# Patient Record
Sex: FEMALE | Race: WHITE | Employment: OTHER | URBAN - METROPOLITAN AREA
[De-identification: names, ages, dates, MRNs, and addresses within clinical notes are randomized per-mention and may not be internally consistent; named-entity substitution may affect disease eponyms.]

---

## 2018-12-27 ENCOUNTER — CARE COORDINATION (OUTPATIENT)
Dept: CARDIOLOGY | Facility: CLINIC | Age: 63
End: 2018-12-27

## 2018-12-27 NOTE — PROGRESS NOTES
Referral- Heart Failure    DEMOGRAPHICS       Demographic Information on Sandy Sadler:    Sandy Sadler  Gender: female  : 1955  [address]  [facility] [address]  [phone] (home)     Medical Record: [id number]  Social Security Number: (Not on file)  Pharmacy: Data Unavailable  Primary Care Provider: No primary care provider on file.    Parent's names are: Data Unavailable (mother) and Data Unavailable (father).    Insurance: No coverage found.        REFERRAL INFORMATION         Referring Provider JEFF Cotto   Referring Clinic [facility]   Referring Contact Info  - [phone]  Faxed - [phone]   Referring Diagnosis Chronic Systolic Heart Failure - Non Ischemic CM - CAD - HTN - HLD   Referring [facility] routine     SPOC Notes:     Referred to us by Deepa and will need appt soon.       PROBLEM/MEDICATION LIST:       There is no problem list on file for this patient.          REFERRAL CHECKLIST:       Records Received  Records/Images Scanned Appointment Made   yes yes yes   Patient [employer] Insurance Entered Packet Sent   yes yes yes       PLAN:       Appts made with [place] on 19

## 2019-01-02 DIAGNOSIS — I50.22 CHRONIC SYSTOLIC CHF (CONGESTIVE HEART FAILURE) (H): ICD-10-CM

## 2019-01-02 DIAGNOSIS — I25.5 ISCHEMIC CARDIOMYOPATHY: Primary | ICD-10-CM

## 2019-01-02 PROBLEM — S72.009A HIP FRACTURE (H): Status: ACTIVE | Noted: 2018-05-26

## 2019-01-02 PROBLEM — M16.9 DEGENERATIVE JOINT DISEASE (DJD) OF HIP: Status: ACTIVE | Noted: 2018-03-06

## 2019-01-02 PROBLEM — Z96.642 HISTORY OF TOTAL LEFT HIP REPLACEMENT: Status: ACTIVE | Noted: 2018-06-05

## 2019-01-02 PROBLEM — Z78.9 ALCOHOL CONSUMPTION OF ONE TO FOUR DRINKS PER DAY: Status: ACTIVE | Noted: 2018-06-06

## 2019-01-02 PROBLEM — I25.10 CORONARY ARTERY DISEASE INVOLVING NATIVE CORONARY ARTERY OF NATIVE HEART WITHOUT ANGINA PECTORIS: Status: ACTIVE | Noted: 2018-09-25

## 2019-01-02 RX ORDER — TIOTROPIUM BROMIDE 18 UG/1
18 CAPSULE ORAL; RESPIRATORY (INHALATION) DAILY
COMMUNITY

## 2019-01-02 RX ORDER — METOPROLOL SUCCINATE 50 MG/1
1 TABLET, EXTENDED RELEASE ORAL 2 TIMES DAILY
COMMUNITY
Start: 2018-10-31

## 2019-01-02 RX ORDER — ROSUVASTATIN CALCIUM 40 MG/1
1 TABLET, COATED ORAL DAILY
COMMUNITY
Start: 2018-12-04 | End: 2019-12-09

## 2019-01-02 RX ORDER — TRIAMCINOLONE ACETONIDE 0.25 MG/G
CREAM TOPICAL
COMMUNITY
Start: 2015-12-07

## 2019-01-02 RX ORDER — TRAMADOL HYDROCHLORIDE 50 MG/1
1 TABLET ORAL EVERY 6 HOURS PRN
COMMUNITY
Start: 2018-12-17

## 2019-01-02 RX ORDER — FUROSEMIDE 40 MG
0.5 TABLET ORAL DAILY PRN
COMMUNITY
Start: 2018-11-20 | End: 2019-02-20

## 2019-01-02 RX ORDER — ASPIRIN 81 MG/1
1 TABLET ORAL DAILY
COMMUNITY

## 2019-01-02 RX ORDER — LIDOCAINE 40 MG/G
CREAM TOPICAL
Status: CANCELLED | OUTPATIENT
Start: 2019-01-02

## 2019-01-02 RX ORDER — ACETAMINOPHEN 500 MG
1 TABLET ORAL EVERY 6 HOURS PRN
COMMUNITY
Start: 2018-06-14

## 2019-01-02 RX ORDER — NITROGLYCERIN 0.4 MG/1
0.4 TABLET SUBLINGUAL EVERY 5 MIN PRN
COMMUNITY
Start: 2018-06-14 | End: 2019-06-19

## 2019-01-02 NOTE — PROGRESS NOTES
Called and left message for patient to call me directly to discuss referral for advanced HF. Records are available in Care Everywhere.  Patient [place] about [place] hours from the  and will likely need to have echo (last one in 9/2018), CPX, labs, HF provider appt and possibly RHC while she is in town.    Spoke with patient this afternoon. We discussed the need for the testing as noted above.  She is agreeable and we will attempt to coordinate and schedule the appts within the next few weeks.  Information given regarding staying at the [facility] while she is in town.

## 2019-01-16 ENCOUNTER — DOCUMENTATION ONLY (OUTPATIENT)
Dept: CARE COORDINATION | Facility: CLINIC | Age: 64
End: 2019-01-16

## 2019-01-28 DIAGNOSIS — I50.22 CHRONIC SYSTOLIC CHF (CONGESTIVE HEART FAILURE) (H): Primary | ICD-10-CM

## 2019-02-14 ENCOUNTER — CARE COORDINATION (OUTPATIENT)
Dept: CARDIOLOGY | Facility: CLINIC | Age: 64
End: 2019-02-14

## 2019-02-14 DIAGNOSIS — I77.1 STENOSIS OF LEFT SUBCLAVIAN ARTERY (H): ICD-10-CM

## 2019-02-14 PROBLEM — Z74.09 IMPAIRED MOBILITY AND ACTIVITIES OF DAILY LIVING: Status: ACTIVE | Noted: 2018-06-05

## 2019-02-14 PROBLEM — E87.1 HYPONATREMIA: Status: ACTIVE | Noted: 2018-06-05

## 2019-02-14 PROBLEM — Z78.9 IMPAIRED MOBILITY AND ACTIVITIES OF DAILY LIVING: Status: ACTIVE | Noted: 2018-06-05

## 2019-02-14 PROBLEM — Z96.9 RETAINED ORTHOPEDIC HARDWARE: Status: ACTIVE | Noted: 2018-03-06

## 2019-02-14 NOTE — PROGRESS NOTES
Patient is referred for advanced HF options from Deepa Mak APRN-CNP and Dr. Omar Hernandez from   [facility]    [address] [place] [address]    [phone]    Records available in Care Everywhere.  Patient is a 63yr old female  with a past medical history of chronic systolic heart failure, nonischemic cardiomyopathy, CAD, HTN, HLD, and tobacco abuse, osteoporosis.  She was admitted in 05/2018 with right humerus and femur fracture after tripping. Echocardiogram performed showed decrement in EF to 30-35%. Troponin was elevated as well and she underwent coronary angiogram, which revealed severe single-vessel and branch coronary artery disease with recommendations for PCI of RCA  in the future if symptoms persisted. Heart failure medications were started at low dosages and were unable to titrate due to hypotension. She was last seen in the HF clinic in in 09/2018, at which time she was feeling ok. Repeat echo showed EF down slightly to 25-30%. Given this, her HF medications were increased and it was recommended she undergo PCI of RCA  per Dr. JOSE M Hernandez. Also was noted significant difference in BP between right and left arm, therefore upper extremity duplex was ordered. Duplex showed >50% stenosis of L prox subclavian artery. Initially presented for PCI 9/24, but was noted to have hyponatremia and it was rescheduled. Had ICD implanted 10/4/18. Reported increased swelling on 10/10 and lasix was started. Underwent repeat angiogram on 10/15/18 for PCI to RCA , which was unsuccessful via antegrade approach. Viability study showed lack of viability in inerior area. Started on entresto and titrating up.      Fatigue and weak but states no symptoms have worsened. Is losing weight. Denies chest pain, syncope, or palpitations. No dizziness. No falls. Denies shortness of breath at rest, paroxysmal noctural dyspnea, or orthopnea. No worsening of dyspnea on exertion. Poor  appetite. No abdominal bloating. Has a bad left knee and needs knees surgery. This limits her activity.     Past Medical History:   Diagnosis Date     CHF (congestive heart failure)     Coronary artery disease     Distal radius fracture, right 5-10-14   right distal radius and ulna FX     Eczema 2/18/2008     Fractured hip 2011   fell at [facility]     Hypercholesteremia     Hypertension 10/8/2007     Lung nodule     Sinusitis     Tobacco abuse - .25 ppd for 33 years - notes state quit date of 5/26/2018.   Alcohol use - last note (12/2018) states drinks 10 cans beer/wk or 3 beers/day      ASSESSMENT/PLAN:  1. Chronic systolic heart failure. Nonischemic cardiomyopathy. NYHA FC III.   Echo 5/31/2018. LVEF 30-35%, 2+ MR, LVEDD 5.8 cm. Echo today shows LVEF of 25-30%, 2+ MR and LVEDd 5.9 cm.   ELIA 10/15/18. LVEF 20-25%, +2 MR   Beta-blocker: Continue Toprol XL 50 mg BID. Up-titrate as tolerated   ACEi/ARB/ARNI: Entresto. Increase to 24/26mg po in the morning and 49/51mg po in the evening. If tolerating, will go medium dose BID then. BMP in 1 week.   Aldosterone antagonist: Stopped due to hyponatremia.  Volume status: Appears compensated and euvolemic on exam. Recent BMP stable.   Diuretic regimen: Continue lasix 20 mg PRN  Rhythm: SR  Device therapy: s/p single chamber ICD  Ischemic assessment: Known  of RCA, unsuccessful PCI, branch disease    Advanced therapies: Upon reviewing clinical picture and case, she has hyponatremia and cachexia and unable to uptitrate Heart Failure meds. EF not improving. Her Heart Failure was first diagnosed 5/2018 after a fall but I think she likely had it for years undiagnosed. She is only 63. Lengthy discussion and she agreed upon being evaluated at the [facility]. Will send referral.      2. CAD: Angiogram 6/01/2018 revealed 30% left main, 30% distal LAD, 80% ostial septal, 50% ostial circumflex, 100%  of RCA. Attempted antegrade PCI of RCA , which was unsuccessful. Continue aspirin,  statin and BB.  There is lack of myocardial viability in apical septal and apical inferior segments, as well as in mid septal segment. The rest of myocardium is viable.    3. Hypertension. BP controlled. Meds as per CHF  4. HLD. Continue simvastatin. Last LDL 73.   5. Left subclavian artery stenosis noted per duplex.

## 2019-02-15 ENCOUNTER — HEALTH MAINTENANCE LETTER (OUTPATIENT)
Age: 64
End: 2019-02-15

## 2019-02-20 ENCOUNTER — HOSPITAL ENCOUNTER (OUTPATIENT)
Dept: CARDIOLOGY | Facility: CLINIC | Age: 64
Discharge: HOME | End: 2019-02-20
Attending: INTERNAL MEDICINE | Admitting: INTERNAL MEDICINE
Payer: COMMERCIAL

## 2019-02-20 ENCOUNTER — OFFICE VISIT (OUTPATIENT)
Dept: CARDIOLOGY | Facility: CLINIC | Age: 64
End: 2019-02-20
Attending: INTERNAL MEDICINE
Payer: COMMERCIAL

## 2019-02-20 VITALS
HEIGHT: 66 IN | WEIGHT: 98.4 LBS | SYSTOLIC BLOOD PRESSURE: 126 MMHG | BODY MASS INDEX: 15.81 KG/M2 | HEART RATE: 86 BPM | DIASTOLIC BLOOD PRESSURE: 79 MMHG | OXYGEN SATURATION: 97 %

## 2019-02-20 VITALS
SYSTOLIC BLOOD PRESSURE: 124 MMHG | DIASTOLIC BLOOD PRESSURE: 70 MMHG | WEIGHT: 100 LBS | BODY MASS INDEX: 16.07 KG/M2 | HEIGHT: 66 IN

## 2019-02-20 DIAGNOSIS — I50.22 CHRONIC SYSTOLIC CHF (CONGESTIVE HEART FAILURE) (H): ICD-10-CM

## 2019-02-20 DIAGNOSIS — I50.22 CHRONIC SYSTOLIC CHF (CONGESTIVE HEART FAILURE) (H): Primary | ICD-10-CM

## 2019-02-20 DIAGNOSIS — I25.5 ISCHEMIC CARDIOMYOPATHY: ICD-10-CM

## 2019-02-20 LAB
ALBUMIN SERPL-MCNC: 3.8 G/DL (ref 3.4–5)
ALP SERPL-CCNC: 113 U/L (ref 40–150)
ALT SERPL W P-5'-P-CCNC: 57 U/L (ref 0–50)
ANION GAP SERPL CALCULATED.3IONS-SCNC: 7 MMOL/L (ref 3–14)
AST SERPL W P-5'-P-CCNC: 37 U/L (ref 0–45)
BASOPHILS # BLD AUTO: 0 10E9/L (ref 0–0.2)
BASOPHILS NFR BLD AUTO: 0.2 %
BILIRUB SERPL-MCNC: 1 MG/DL (ref 0.2–1.3)
BUN SERPL-MCNC: 8 MG/DL (ref 7–30)
CALCIUM SERPL-MCNC: 9 MG/DL (ref 8.5–10.1)
CHLORIDE SERPL-SCNC: 95 MMOL/L (ref 94–109)
CO2 SERPL-SCNC: 28 MMOL/L (ref 20–32)
CREAT SERPL-MCNC: 0.38 MG/DL (ref 0.52–1.04)
DIFFERENTIAL METHOD BLD: ABNORMAL
EOSINOPHIL # BLD AUTO: 0 10E9/L (ref 0–0.7)
EOSINOPHIL NFR BLD AUTO: 0.2 %
ERYTHROCYTE [DISTWIDTH] IN BLOOD BY AUTOMATED COUNT: 12.6 % (ref 10–15)
GFR SERPL CREATININE-BSD FRML MDRD: >90 ML/MIN/{1.73_M2}
GLUCOSE SERPL-MCNC: 142 MG/DL (ref 70–99)
HCT VFR BLD AUTO: 47.6 % (ref 35–47)
HGB BLD-MCNC: 16.8 G/DL (ref 11.7–15.7)
IMM GRANULOCYTES # BLD: 0 10E9/L (ref 0–0.4)
IMM GRANULOCYTES NFR BLD: 0.3 %
LYMPHOCYTES # BLD AUTO: 1.9 10E9/L (ref 0.8–5.3)
LYMPHOCYTES NFR BLD AUTO: 17.3 %
MCH RBC QN AUTO: 38 PG (ref 26.5–33)
MCHC RBC AUTO-ENTMCNC: 35.3 G/DL (ref 31.5–36.5)
MCV RBC AUTO: 108 FL (ref 78–100)
MONOCYTES # BLD AUTO: 0.8 10E9/L (ref 0–1.3)
MONOCYTES NFR BLD AUTO: 7.5 %
NEUTROPHILS # BLD AUTO: 8.2 10E9/L (ref 1.6–8.3)
NEUTROPHILS NFR BLD AUTO: 74.5 %
NRBC # BLD AUTO: 0 10*3/UL
NRBC BLD AUTO-RTO: 0 /100
PLATELET # BLD AUTO: 274 10E9/L (ref 150–450)
POTASSIUM SERPL-SCNC: 4.1 MMOL/L (ref 3.4–5.3)
PROT SERPL-MCNC: 7.7 G/DL (ref 6.8–8.8)
RBC # BLD AUTO: 4.42 10E12/L (ref 3.8–5.2)
SODIUM SERPL-SCNC: 130 MMOL/L (ref 133–144)
WBC # BLD AUTO: 11 10E9/L (ref 4–11)

## 2019-02-20 PROCEDURE — G0463 HOSPITAL OUTPT CLINIC VISIT: HCPCS | Mod: 25

## 2019-02-20 PROCEDURE — 36415 COLL VENOUS BLD VENIPUNCTURE: CPT | Performed by: INTERNAL MEDICINE

## 2019-02-20 PROCEDURE — 94621 CARDIOPULM EXERCISE TESTING: CPT | Mod: 26 | Performed by: INTERNAL MEDICINE

## 2019-02-20 PROCEDURE — 93295 DEV INTERROG REMOTE 1/2/MLT: CPT | Performed by: INTERNAL MEDICINE

## 2019-02-20 PROCEDURE — 80053 COMPREHEN METABOLIC PANEL: CPT | Performed by: INTERNAL MEDICINE

## 2019-02-20 PROCEDURE — 85025 COMPLETE CBC W/AUTO DIFF WBC: CPT | Performed by: INTERNAL MEDICINE

## 2019-02-20 PROCEDURE — 93296 REM INTERROG EVL PM/IDS: CPT | Mod: ZF

## 2019-02-20 PROCEDURE — 99204 OFFICE O/P NEW MOD 45 MIN: CPT | Mod: 25 | Performed by: INTERNAL MEDICINE

## 2019-02-20 PROCEDURE — 94621 CARDIOPULM EXERCISE TESTING: CPT

## 2019-02-20 ASSESSMENT — MIFFLIN-ST. JEOR
SCORE: 1023.09
SCORE: 1030.1

## 2019-02-20 ASSESSMENT — PAIN SCALES - GENERAL: PAINLEVEL: NO PAIN (0)

## 2019-02-20 NOTE — PATIENT INSTRUCTIONS
Cardiology Providers you saw during your visit:  Dr. Callaway    Medication changes:  No medication changes    Follow up:  1- Right heart cath tomorrow as planned.  2- Please see endocrinology for Osteoporosis treatment.  3-We will send you information about VAD    Please return to clinic for follow-up:  With Dr. Callaway in 1 year      Please call if you have:  1. Weight gain of more than 2 pounds in a day or 5 pounds in a week  2. Increased shortness of breath, swelling or bloating  3. Dizziness, lightheadedness   4. Any questions or concerns.     Follow the American Heart Association Diet and Lifestyle recommendations:  Limit saturated fat, trans fat, sodium, red meat, sweets and sugar-sweetened beverages. If you choose to eat red meat, compare labels and select the leanest cuts available.  Aim for at least 150 minutes of moderate physical activity or 75 minutes of vigorous physical activity - or an equal combination of both - each week.    During business hours: [phone], press option # 1 to be routed to the [facility] then option # 3 for medical questions to speak with a nurse     After hours, weekends or holidays: On Call Cardiologist- [phone]   option #4 and ask to speak to the on-call Cardiologist. Inform them you are a CORE/heart failure patient at the [facility].      Bk Nelson, RN  Cardiology Nurse Care Coordinator    Keep up the good work!    Take Care!

## 2019-02-20 NOTE — NURSING NOTE
Chief Complaint   Patient presents with     Follow Up     reason for visit:New patient- Chronic systolic congestive heart failure     Vitals were taken and medications were reconciled.    12:19 PM Melvin Rojas, Student CMA

## 2019-02-21 ENCOUNTER — HOSPITAL ENCOUNTER (OUTPATIENT)
Facility: CLINIC | Age: 64
Discharge: HOME | End: 2019-02-21
Attending: INTERNAL MEDICINE | Admitting: INTERNAL MEDICINE
Payer: COMMERCIAL

## 2019-02-21 ENCOUNTER — APPOINTMENT (OUTPATIENT)
Dept: MEDSURG UNIT | Facility: CLINIC | Age: 64
End: 2019-02-21
Payer: COMMERCIAL

## 2019-02-21 VITALS
DIASTOLIC BLOOD PRESSURE: 70 MMHG | TEMPERATURE: 97.7 F | BODY MASS INDEX: 16.23 KG/M2 | RESPIRATION RATE: 20 BRPM | SYSTOLIC BLOOD PRESSURE: 155 MMHG | OXYGEN SATURATION: 99 % | HEIGHT: 66 IN | WEIGHT: 101 LBS

## 2019-02-21 DIAGNOSIS — I25.5 ISCHEMIC CARDIOMYOPATHY: ICD-10-CM

## 2019-02-21 DIAGNOSIS — I50.22 CHRONIC SYSTOLIC CHF (CONGESTIVE HEART FAILURE) (H): ICD-10-CM

## 2019-02-21 LAB
ANION GAP SERPL CALCULATED.3IONS-SCNC: 12 MMOL/L (ref 3–14)
BUN SERPL-MCNC: 7 MG/DL (ref 7–30)
CALCIUM SERPL-MCNC: 9.1 MG/DL (ref 8.5–10.1)
CHLORIDE SERPL-SCNC: 94 MMOL/L (ref 94–109)
CO2 SERPL-SCNC: 28 MMOL/L (ref 20–32)
CREAT SERPL-MCNC: 0.42 MG/DL (ref 0.52–1.04)
GFR SERPL CREATININE-BSD FRML MDRD: >90 ML/MIN/{1.73_M2}
GLUCOSE SERPL-MCNC: 72 MG/DL (ref 70–99)
POTASSIUM SERPL-SCNC: 3.1 MMOL/L (ref 3.4–5.3)
SODIUM SERPL-SCNC: 134 MMOL/L (ref 133–144)

## 2019-02-21 PROCEDURE — 40000166 ZZH STATISTIC PP CARE STAGE 1

## 2019-02-21 PROCEDURE — 25000125 ZZHC RX 250: Performed by: INTERNAL MEDICINE

## 2019-02-21 PROCEDURE — 93451 RIGHT HEART CATH: CPT | Performed by: INTERNAL MEDICINE

## 2019-02-21 PROCEDURE — 93451 RIGHT HEART CATH: CPT | Mod: 26 | Performed by: INTERNAL MEDICINE

## 2019-02-21 PROCEDURE — 36415 COLL VENOUS BLD VENIPUNCTURE: CPT | Performed by: INTERNAL MEDICINE

## 2019-02-21 PROCEDURE — 27210794 ZZH OR GENERAL SUPPLY STERILE: Performed by: INTERNAL MEDICINE

## 2019-02-21 PROCEDURE — 80048 BASIC METABOLIC PNL TOTAL CA: CPT | Performed by: INTERNAL MEDICINE

## 2019-02-21 RX ORDER — LIDOCAINE HYDROCHLORIDE 10 MG/ML
INJECTION, SOLUTION EPIDURAL; INFILTRATION; INTRACAUDAL; PERINEURAL
Status: DISCONTINUED | OUTPATIENT
Start: 2019-02-21 | End: 2019-02-21 | Stop reason: HOSPADM

## 2019-02-21 RX ORDER — LIDOCAINE 40 MG/G
CREAM TOPICAL
Status: COMPLETED | OUTPATIENT
Start: 2019-02-21 | End: 2019-02-21

## 2019-02-21 RX ADMIN — LIDOCAINE: 40 CREAM TOPICAL at 10:36

## 2019-02-21 ASSESSMENT — MIFFLIN-ST. JEOR: SCORE: 1034.63

## 2019-02-21 NOTE — DISCHARGE INSTRUCTIONS
[facility]                        Interventional Cardiology  Discharge Instructions   Post Right Heart Cath      AFTER YOU GO HOME:    DO drink plenty of fluids    DO resume your regular diet and medications unless otherwise instructed by your Primary Physician    Do Not scrub the procedure site vigorously    No lotion or powder to the puncture site for 3 days    CALL YOUR PRIMARY PHYSICIAN IF: You may resume all normal activity.  Monitor neck site for bleeding, swelling, or voice changes. If you notice bleeding or swelling immediately apply pressure to the site and call number below to speak with Cardiology Fellow.  If you experience any changes in your breathing you should call your doctor immediately or come to the closest Emergency Department.  Do not drive yourself.    ADDITIONAL INSTRUCTIONS: Medications: You are to resume all home medications including anticoagulation therapy unless otherwise advised by your primary cardiologist or nurse coordinator.    Follow Up: Per your primary cardiology team    If you have any questions or concerns regarding your procedure site please call [phone] at anytime and ask for Cardiology Fellow on call.  They are available 24 hours a day.  You may also contact the Cardiology Clinic after hours number at [phone].                                                       Telephone Numbers [phone] Monday-Friday 8:00 am to 4:30 pm    [phone] After 4:30 pm Monday-Friday, Weekends & Holidays  Ask for Interventional Cardiologist on call. Someone is on call 24 hours/day   [facility] toll free number [phone] Monday-Friday 8:00 am to 4:30 pm   [facility] Emergency Dept [phone]

## 2019-02-21 NOTE — PROGRESS NOTES
Returned from [facility] s/p RHC.  VSS.  RIJ site covered with a band aid, CDI.  Discharge instructions previously reviewed.  Discharged to shuttle after eating breakfast

## 2019-02-21 NOTE — IP AVS SNAPSHOT
[address] [address]                                    After Visit Summary   2/21/2019    Sandy Sadler    MRN: [id number]           After Visit Summary Signature Page    I have received my discharge instructions, and my questions have been answered. I have discussed any challenges I see with this plan with the nurse or doctor.    ..........................................................................................................................................  Patient/Patient Representative Signature      ..........................................................................................................................................  Patient Representative Print Name and Relationship to Patient    ..................................................               ................................................  Date                                   Time    ..........................................................................................................................................  Reviewed by Signature/Title    ...................................................              ..............................................  Date                                               Time          22EPIC Rev 08/18

## 2019-02-21 NOTE — IP AVS SNAPSHOT
MRN:[id number]                      After Visit Summary   2/21/2019    Sandy Sadler    MRN: [id number]           Visit Information        Department      2/21/2019  9:36 AM [address] [facility]          Review of your medicines      UNREVIEWED medicines. Ask your doctor about these medicines       Dose / Directions   acetaminophen 500 MG tablet  Commonly known as:  TYLENOL      Dose:  1 tablet  Take 1 tablet by mouth every 6 hours as needed for pain  Refills:  0     aspirin 81 MG EC tablet      Dose:  1 tablet  Take 1 tablet by mouth daily  Refills:  0     diphenhydrAMINE-acetaminophen  MG tablet  Commonly known as:  TYLENOL PM      Dose:  1-2 tablet  Take 1-2 tablets by mouth nightly as needed for Insomnia  Refills:  0     metoprolol succinate ER 50 MG 24 hr tablet  Commonly known as:  TOPROL-XL      Dose:  1 tablet  Take 1 tablet by mouth 2 times daily  Refills:  0     nitroGLYcerin 0.4 MG sublingual tablet  Commonly known as:  NITROSTAT      Dose:  0.4 mg  Place 0.4 mg under the tongue every 5 minutes as needed for chest pain Up to total of 3  Refills:  0     rosuvastatin 40 MG tablet  Commonly known as:  CRESTOR      Dose:  1 tablet  Take 1 tablet by mouth daily  Refills:  0     sacubitril-valsartan  MG per tablet  Commonly known as:  ENTRESTO  Used for:  Chronic systolic CHF (congestive heart failure) (H)      Dose:  1 tablet  Take 1 tablet by mouth 2 times daily  Quantity:  180 tablet  Refills:  3     SPIRIVA HANDIHALER 18 MCG inhaled capsule  Generic drug:  tiotropium      Dose:  18 mcg  Inhale 18 mcg into the lungs daily  Refills:  0     traMADol 50 MG tablet  Commonly known as:  ULTRAM      Dose:  1 tablet  Take 1 tablet by mouth every 6 hours as needed for moderate pain  Refills:  0     triamcinolone 0.025 % cream  Commonly known as:  KENALOG      Apply to affected area 2 times a day as needed for other (Specify) (eczema). Please dispense larger quantity than 15 g -  thanks  Refills:  0     URE-NA 15 g Pack packet  Generic drug:  Urea      Take 1 packet (15 g) by mouth 1 time per day  Refills:  0     vitamin D3 1000 units (25 mcg) tablet  Commonly known as:  CHOLECALCIFEROL      Dose:  1 tablet  Take 1 tablet by mouth daily  Refills:  0              Protect others around you: Learn how to safely use, store and throw away your medicines at www.disposemymeds.org.       Follow-ups after your visit       Your next 10 appointments already scheduled    Feb 21, 2019  Procedure with Maico Elias MD  [facility], [place],  Heart Cath Lab ([facility], [facility]) [address] [facility] [address]  [phone]   The [facility] is located on the corner of [facility] and [facility] on the [facility]. It is easily accessible from virtually any point in the [place] area, via [address] and [address].      Care Instructions       Further instructions from your care team       [facility]                        Interventional Cardiology  Discharge Instructions   Post Right Heart Cath      AFTER YOU GO HOME:    DO drink plenty of fluids    DO resume your regular diet and medications unless otherwise instructed by your Primary Physician    Do Not scrub the procedure site vigorously    No lotion or powder to the puncture site for 3 days    CALL YOUR PRIMARY PHYSICIAN IF: You may resume all normal activity.  Monitor neck site for bleeding, swelling, or voice changes. If you notice bleeding or swelling immediately apply pressure to the site and call number below to speak with Cardiology Fellow.  If you experience any changes in your breathing you should call your doctor immediately or come to the closest Emergency Department.  Do not drive yourself.    ADDITIONAL INSTRUCTIONS: Medications: You are to resume all home medications including anticoagulation therapy unless otherwise advised by your primary  cardiologist or nurse coordinator.    Follow Up: Per your primary cardiology team    If you have any questions or concerns regarding your procedure site please call [phone] at anytime and ask for Cardiology Fellow on call.  They are available 24 hours a day.  You may also contact the Cardiology Clinic after hours number at [phone].                                                       Telephone Numbers [phone] Monday-Friday 8:00 am to 4:30 pm    [phone] After 4:30 pm Monday-Friday, Weekends & Holidays  Ask for Interventional Cardiologist on call. Someone is on call 24 hours/day   [facility] toll free number [phone] Monday-Friday 8:00 am to 4:30 pm   [facility] Emergency Dept [phone]                   Additional Information About Your Visit       MyChart Information    "[employer]"t gives you secure access to your electronic health record. If you see a primary care provider, you can also send messages to your care team and make appointments. If you have questions, please call your primary care clinic.  If you do not have a primary care provider, please call [phone] and they will assist you.       Care EveryWhere ID    This is your Care EveryWhere ID. This could be used by other organizations to access your [facility] medical records  [id number]       Your Vitals Were     Blood Pressure   134/64 (BP Location: Right arm, Cuff Size: Adult Small)    Temperature   97.7  F (36.5  C) (Oral)    Respirations   20    Pulse Oximetry   99%           Primary Care Provider Office Phone # Fax #    Yayo RUIZ Spencer [phone]      Equal Access to Services    [facility] CenterEVELIN : Hadii aad ku hadasho Soomaali, waaxda luqadaha, qaybta kaalmada adeegadan, tory forbes . So [facility] [phone].    ATENCIÓN: Si habla español, tiene a carroll disposición servicios gratuitos de asistencia lingüística. Llame al [phone].    We comply with applicable federal civil rights laws  and [place] laws. We do not discriminate on the basis of race, color, national origin, age, disability, sex, sexual orientation, or gender identity.           Thank you!    Thank you for choosing [facility] for your care. Our goal is always to provide you with excellent care. Hearing back from our patients is one way we can continue to improve our services. Please take a few minutes to complete the written survey that you may receive in the mail after you visit with us. Thank you!            Medication List      ASK your doctor about these medications          Morning Afternoon Evening Bedtime As Needed    acetaminophen 500 MG tablet  Also known as:  TYLENOL  INSTRUCTIONS:  Take 1 tablet by mouth every 6 hours as needed for pain                     aspirin 81 MG EC tablet  INSTRUCTIONS:  Take 1 tablet by mouth daily                     diphenhydrAMINE-acetaminophen  MG tablet  Also known as:  TYLENOL PM  INSTRUCTIONS:  Take 1-2 tablets by mouth nightly as needed for Insomnia                     metoprolol succinate ER 50 MG 24 hr tablet  Also known as:  TOPROL-XL  INSTRUCTIONS:  Take 1 tablet by mouth 2 times daily                     nitroGLYcerin 0.4 MG sublingual tablet  Also known as:  NITROSTAT  INSTRUCTIONS:  Place 0.4 mg under the tongue every 5 minutes as needed for chest pain Up to total of 3                     rosuvastatin 40 MG tablet  Also known as:  CRESTOR  INSTRUCTIONS:  Take 1 tablet by mouth daily                     sacubitril-valsartan  MG per tablet  Also known as:  ENTRESTO  INSTRUCTIONS:  Take 1 tablet by mouth 2 times daily                     SPIRIVA HANDIHALER 18 MCG inhaled capsule  INSTRUCTIONS:  Inhale 18 mcg into the lungs daily  Generic drug:  tiotropium                     traMADol 50 MG tablet  Also known as:  ULTRAM  INSTRUCTIONS:  Take 1 tablet by mouth every 6 hours as needed for moderate pain                     triamcinolone 0.025 % cream  Also known as:   KENALOG  INSTRUCTIONS:  Apply to affected area 2 times a day as needed for other (Specify) (eczema). Please dispense larger quantity than 15 g - thanks                     URE-NA 15 g Pack packet  INSTRUCTIONS:  Take 1 packet (15 g) by mouth 1 time per day  Generic drug:  Urea                     vitamin D3 1000 units (25 mcg) tablet  Also known as:  CHOLECALCIFEROL  INSTRUCTIONS:  Take 1 tablet by mouth daily

## 2019-02-21 NOTE — PROGRESS NOTES
1035  Prep is complete for procedure.  Sandy is here for right heart cath.  Denies any pain.  Consent is complete.  Labs are from yesterday.  CTRN

## 2019-03-24 NOTE — PROGRESS NOTES
HPI: 62 yo female with past medical history of chronic systolic heart failure, CAD but heart failure out of proportion to her CAD HTN, HLD, and tobacco abuse, osteoporosis is referred for evaluation for advanced therapies.  Pt was admitted in [place] in 05/2018 with right humerus and femur fracture after tripping. Echocardiogram performed showed decrement in EF to 30-35%. Troponin was elevated as well and she underwent coronary angiogram, which revealed severe single-vessel and branch coronary artery disease with recommendations for PCI of RCA  in the future if symptoms persisted. Heart failure medications were started at low dosages and were unable to titrate due to hypotension. She was last seen in the [place] HF clinic in in 09/2018, at which time she was feeling ok. Repeat echo showed EF down slightly to 25-30%. Given this, her HF medicsations were increased and it was recommended she undergo PCI of RCA  per Dr. JOSE M Hernandez. Also was noted significant difference in BP between right and left arm, therefore upper extremity duplex was ordered. Duplex showed >50% stenosis of L prox subclavian artery. Initially presented for PCI 9/24, but was noted to have hyponatremia and it was rescheduled. Had ICD implanted 10/4/18. Reported increased swelling on 10/10 and lasix was started. Underwent repeat angiogram on 10/15/18 for PCI to RCA , which was unsuccessful via antegrade approach. Viability study showed lack of viability in inerior area thus further intervention was not pursued.  She was then transitioed to [facility] with ongoing titration.     Today patient reports that overall she has been well fairly well.  Reports that at present she feels she is limited more by her knees and hips and not so much by sob.  She denies any chest pain or pressure, orthopnea, pnd, palpitations, syncope/presyncope or nely.  She also denies any problems with her medications and reports compliance.      PAST MEDICAL  HISTORY:  Past Medical History:   Diagnosis Date     Arthritis      Heart disease      Hypertension      Stenosis of left subclavian artery  2019    Per duplex scan   - CAD  - chronic systolic heart failure with heart failure out of proportion to her CAD    FAMILY HISTORY:    Other Mother   osteoporosis     Dementia Mother     Heart Father     Heart Brother   pacemaker    Hypertension Brother     No Known Problems Daughter     No Known Problems Son     Heart Maternal Aunt    in her 50's; MI     Heart Maternal Uncle   diagnosed in his 20's     Diabetes Paternal Grandmother     Other Paternal Grandfather   emphysema         SOCIAL HISTORY:  Social History     Socioeconomic History     Marital status:      Spouse name: Not on file     Number of children: Not on file     Years of education: Not on file     Highest education level: Not on file   Occupational History     Not on file   Social Needs     Financial resource strain: Not on file     Food insecurity:     Worry: Not on file     Inability: Not on file     Transportation needs:     Medical: Not on file     Non-medical: Not on file   Tobacco Use     Smoking status: Former Smoker     Packs/day: 5.00     Types: Cigarettes     Last attempt to quit: 2018     Years since quittin.8     Smokeless tobacco: Never Used   Substance and Sexual Activity     Alcohol use: Yes     Drug use: None     Sexual activity: Not on file   Lifestyle     Physical activity:     Days per week: Not on file     Minutes per session: Not on file     Stress: Not on file   Relationships     Social connections:     Talks on phone: Not on file     Gets together: Not on file     Attends [employer] service: Not on file     Active member of club or organization: Not on file     Attends meetings of clubs or organizations: Not on file     Relationship status: Not on file     Intimate partner violence:     Fear of current or ex partner: Not on file     Emotionally abused: Not on  file     Physically abused: Not on file     Forced sexual activity: Not on file   Other Topics Concern     Parent/sibling w/ CABG, MI or angioplasty before 65F 55M? Not Asked   Social History Narrative     Not on file       CURRENT MEDICATIONS:    Current Outpatient Medications on File Prior to Visit:  acetaminophen (TYLENOL) 500 MG tablet Take 1 tablet by mouth every 6 hours as needed for pain   aspirin 81 MG EC tablet Take 1 tablet by mouth daily   diphenhydrAMINE-acetaminophen (TYLENOL PM)  MG tablet Take 1-2 tablets by mouth nightly as needed for Insomnia   metoprolol succinate ER (TOPROL-XL) 50 MG 24 hr tablet Take 1 tablet by mouth 2 times daily   nitroGLYcerin (NITROSTAT) 0.4 MG sublingual tablet Place 0.4 mg under the tongue every 5 minutes as needed for chest pain Up to total of 3   rosuvastatin (CRESTOR) 40 MG tablet Take 1 tablet by mouth daily   sacubitril-valsartan (ENTRESTO)  MG per tablet Take 1 tablet by mouth 2 times daily   tiotropium (SPIRIVA HANDIHALER) 18 MCG inhaled capsule Inhale 18 mcg into the lungs daily   traMADol (ULTRAM) 50 MG tablet Take 1 tablet by mouth every 6 hours as needed for moderate pain   triamcinolone (KENALOG) 0.025 % cream Apply to affected area 2 times a day as needed for other (Specify) (eczema). Please dispense larger quantity than 15 g - thanks   Urea (URE-NA) 15 g PACK packet Take 1 packet (15 g) by mouth 1 time per day   vitamin D3 (CHOLECALCIFEROL) 1000 units (25 mcg) tablet Take 1 tablet by mouth daily     No current facility-administered medications on file prior to visit.       ROS:   Constitutional: No fever, chills, or sweats.  ENT: No visual disturbance, ear ache, epistaxis, sore throat.   Allergies/Immunologic: Negative.   Respiratory: No cough, hemoptysis.   Cardiovascular: As per HPI.   GI: No nausea, vomiting, hematemesis, melena, or hematochezia.   : No urinary frequency, dysuria, or hematuria.   Integument: Negative.   Psychiatric: Negative.  "  Neuro: Negative.   Endocrinology: Negative.   Musculoskeletal: Hio and knee pain    EXAM:  /79 (BP Location: Right arm, Patient Position: Chair, Cuff Size: Adult Regular)   Pulse 86   Ht 1.676 m (5' 6\")   Wt 44.6 kg (98 lb 6.4 oz)   SpO2 97%   Breastfeeding? No   BMI 15.88 kg/m    General: appears comfortable, alert and articulate  Head: normocephalic, atraumatic  Eyes: anicteric sclera, EOMI  Neck: no adenopathy  Orophyarynx: moist mucosa, no lesions, dentition intact  Heart: regular, S1/S2, no murmur, gallop, rub, estimated JVP 8cm with HJR  Lungs: clear, no rales or wheezing  Abdomen: soft, non-tender, bowel sounds present, no hepatomegaly  Extremities: no clubbing, cyanosis or edema  Neurological: normal speech and affect, no gross motor deficits    Labs:  CBC RESULTS:  Lab Results   Component Value Date    WBC 11.0 02/20/2019    RBC 4.42 02/20/2019    HGB 16.8 (H) 02/20/2019    HCT 47.6 (H) 02/20/2019     (H) 02/20/2019    MCH 38.0 (H) 02/20/2019    MCHC 35.3 02/20/2019    RDW 12.6 02/20/2019     02/20/2019       CMP RESULTS:  Lab Results   Component Value Date     02/21/2019    POTASSIUM 3.1 (L) 02/21/2019    CHLORIDE 94 02/21/2019    CO2 28 02/21/2019    ANIONGAP 12 02/21/2019    GLC 72 02/21/2019    BUN 7 02/21/2019    CR 0.42 (L) 02/21/2019    GFRESTIMATED >90 02/21/2019    GFRESTBLACK >90 02/21/2019    JUVENCIO 9.1 02/21/2019    BILITOTAL 1.0 02/20/2019    ALBUMIN 3.8 02/20/2019    ALKPHOS 113 02/20/2019    ALT 57 (H) 02/20/2019    AST 37 02/20/2019        Echo today:  Interpretation Summary  End diastolic volume is 91 ml.  End systolic volume is 61 ml.  LVEF 25% based on biplane 2D tracing.  Inferior wall akinesis is present.  Posterior wall akinesis is present.  Right ventricular function, chamber size, wall motion, and thickness are normal.  The inferior vena cava is normal.  No pericardial effusion is present.  Previous study not available for " comparison.              Assessment and Plan: 62 yo female with past medical history of chronic systolic heart failure, CAD but degree of heart failure out of proportion to her CAD HTN, HLD, and tobacco abuse, osteoporosis is referred for evaluation for advanced therapies.     1. Chronic systolic heart failure secondary to mixed nonischemic and ischemic cardiomyopathy as degree of heart failure out of proportion to her CAD    Stage C  NYHA Class III  ACEi/ARB yes, continue entresto 97-103mg bid  BB yes continue metoprolol xl 50mg bid  Aldosterone antagonist deferred at present.  May recommend starting in future if Cr and K remain stable  SCD prophylaxis ICD  Fluid status {euvolemic based on exam.  Will confirm on RHC tomorrow  NSAID use: contraindicated    CPX today:  Stopped 2/2 hip and knee discomfort and not sob/chest pain/dizziness.  Did not reach anerobic threshold but MVO2 was > 50% despite submaximal effort.  VE/VCO2 slope also < 35 and SBP peak > 120.  Pt also without any evidence of end organ compromise.  All of these indicate that at present patient is too well to benefit from advanced therapies.  However, given her lack of improvement in LVEF despite good heart failure regimen and her age, likely that patient may need these options in the future.  Will obtain RHC tomorrow to establish current hemodynamics and assess for options to optimize medical regimen.  Will also provide patient with literature on LVAD and heart transplant.     Encouraged patient to begin regular aerobic exercise aiming for at least 150 minutes of moderate physical activity or 75 minutes of vigorous physical activity - or an equal combination of both - each week. and follow low-salt, heart healthy diet.   Reminded patient of need to maintain good oral hygiene and continue regular dental care        2.  Osteoporosis:  Discussed with patient recommendation for evaluation and treatment for osteoporosis.  Optimization of this will be  important to optimize future advanced heart failure candidacy.       Follow-up:  One year with repeat CPX.  Will be happy to see sooner if change in clinical status or new questions/concerns arise - especially if patient has recurrent CHF exacerbations or if develops any evidence of end-organ compromise as we would reassess advance heart failure therapy candidacy at that time.      Margarita Callaway MD  Section Head - Advanced Heart Failure, Transplantation and Mechanical Circulatory Support  Director - Adult Congenital and Cardiovascular Genetics Center  Associate Professor of Medicine, [facility]  Patient Care Team:  Yayo Orozco as PCP - General (Family Practice)  MARITZA SOLOMON

## 2019-03-26 LAB
MDC_IDC_LEAD_LOCATION: NORMAL
MDC_IDC_LEAD_LOCATION_DETAIL_1: NORMAL
MDC_IDC_LEAD_MFG: NORMAL
MDC_IDC_LEAD_MODEL: NORMAL
MDC_IDC_LEAD_POLARITY_TYPE: NORMAL
MDC_IDC_LEAD_SERIAL: NORMAL
MDC_IDC_LEAD_SPECIAL_FUNCTION: NORMAL
MDC_IDC_MSMT_BATTERY_DTM: NORMAL
MDC_IDC_MSMT_BATTERY_REMAINING_LONGEVITY: NORMAL
MDC_IDC_MSMT_BATTERY_STATUS: NORMAL
MDC_IDC_MSMT_CAP_CHARGE_TYPE: NORMAL
MDC_IDC_MSMT_LEADCHNL_RV_PACING_THRESHOLD_AMPLITUDE: 0.62 V
MDC_IDC_MSMT_LEADCHNL_RV_PACING_THRESHOLD_PULSEWIDTH: 0.4 MS
MDC_IDC_MSMT_LEADCHNL_RV_SENSING_INTR_AMPL: 13.1 MV
MDC_IDC_PG_MFG: NORMAL
MDC_IDC_PG_MODEL: NORMAL
MDC_IDC_PG_SERIAL: NORMAL
MDC_IDC_PG_TYPE: NORMAL
MDC_IDC_SESS_CLINIC_NAME: NORMAL
MDC_IDC_SESS_DTM: NORMAL
MDC_IDC_SESS_TYPE: NORMAL
MDC_IDC_SET_BRADY_LOWRATE: 40 {BEATS}/MIN
MDC_IDC_SET_BRADY_MODE: NORMAL
MDC_IDC_SET_LEADCHNL_RV_PACING_AMPLITUDE: 2 V
MDC_IDC_SET_LEADCHNL_RV_PACING_CAPTURE_MODE: NORMAL
MDC_IDC_SET_LEADCHNL_RV_PACING_POLARITY: NORMAL
MDC_IDC_SET_LEADCHNL_RV_PACING_PULSEWIDTH: 0.4 MS
MDC_IDC_SET_LEADCHNL_RV_SENSING_SENSITIVITY: 0.3 MV
MDC_IDC_SET_ZONE_DETECTION_BEATS_DENOMINATOR: 40 {BEATS}
MDC_IDC_SET_ZONE_DETECTION_BEATS_NUMERATOR: 30 {BEATS}
MDC_IDC_SET_ZONE_DETECTION_INTERVAL: 300 MS
MDC_IDC_SET_ZONE_DETECTION_INTERVAL: 428.57 MS
MDC_IDC_SET_ZONE_TYPE: NORMAL
MDC_IDC_STAT_BRADY_RV_PERCENT_PACED: 0.1 %

## 2019-05-06 ENCOUNTER — PATIENT OUTREACH (OUTPATIENT)
Dept: CARDIOLOGY | Facility: CLINIC | Age: 64
End: 2019-05-06

## 2019-05-06 NOTE — PROGRESS NOTES
Called patient to check in. No answer. Left voicemail asking her to call with any questions or concerns. Plan for follow up in 1 year from last clinic visit unless she calls in sooner.

## 2019-09-23 ENCOUNTER — TELEPHONE (OUTPATIENT)
Dept: CARDIOLOGY | Facility: CLINIC | Age: 64
End: 2019-09-23

## 2019-09-23 NOTE — TELEPHONE ENCOUNTER
[facility] Call Center    Phone Message    May a detailed message be left on voicemail: yes    Reason for Call: Other: Patient states that they recieved a letter from their insurance company that they need a prior auth for the procedure on 2/21/19 sent in otherwise they will not cover any of the procedure. Please send in and follow up w/ patinet.     Action Taken: Message routed to:  [facility]

## 2019-09-25 NOTE — TELEPHONE ENCOUNTER
Prior auth approved with the following auth number:  [id number] From 1/31/19-4/1/2019  Procedure completed on 2/21/19    Called patient to let her know. Called [employer] to verify. They had auth and correct information. THey are resending it to the patients health plan. No further questions at this time.

## 2019-10-22 NOTE — TELEPHONE ENCOUNTER
[facility] Call Center    Phone Message    May a detailed message be left on voicemail: yes    Reason for Call: Other: Pt called back because the code she recieved was not for the correct procedure. She is needing the procedure code for the Echocardiogram, not for the right heart cath. Please give Ni a call at [phone], as well as a call back to the pt.      Action Taken: Message routed to:  [facility] ([facility] Cardiology

## 2019-10-23 NOTE — TELEPHONE ENCOUNTER
[facility]. Authorization was approved. Auth # [id number], VALID FROM 2/20/2019 - 4/21/2019. [employer] will send information and authorization to insurance plan.    Returned call to patient to let her know. No answer. Left message asking for call back if she needs anything further.

## 2020-02-11 NOTE — LETTER
2/20/2019      RE: Sandy Sadler  [address]  [place] [address]       Dear Colleague,    Thank you for the opportunity to participate in the care of your patient, Sandy Sadler, at the [facility]. Please see a copy of my visit note below.    HPI: 64 yo female with past medical history of chronic systolic heart failure, CAD but heart failure out of proportion to her CAD HTN, HLD, and tobacco abuse, osteoporosis is referred for evaluation for advanced therapies.  Pt was admitted in [place] in 05/2018 with right humerus and femur fracture after tripping. Echocardiogram performed showed decrement in EF to 30-35%. Troponin was elevated as well and she underwent coronary angiogram, which revealed severe single-vessel and branch coronary artery disease with recommendations for PCI of RCA  in the future if symptoms persisted. Heart failure medications were started at low dosages and were unable to titrate due to hypotension. She was last seen in the [place] HF clinic in in 09/2018, at which time she was feeling ok. Repeat echo showed EF down slightly to 25-30%. Given this, her HF medicsations were increased and it was recommended she undergo PCI of RCA  per Dr. JOSE M Hernandez. Also was noted significant difference in BP between right and left arm, therefore upper extremity duplex was ordered. Duplex showed >50% stenosis of L prox subclavian artery. Initially presented for PCI 9/24, but was noted to have hyponatremia and it was rescheduled. Had ICD implanted 10/4/18. Reported increased swelling on 10/10 and lasix was started. Underwent repeat angiogram on 10/15/18 for PCI to RCA , which was unsuccessful via antegrade approach. Viability study showed lack of viability in inerior area thus further intervention was not pursued.  She was then transitioed to [facility] with ongoing titration.     Today patient reports that overall she has been  well fairly well.  Reports that at present she feels she is limited more by her knees and hips and not so much by sob.  She denies any chest pain or pressure, orthopnea, pnd, palpitations, syncope/presyncope or nely.  She also denies any problems with her medications and reports compliance.      PAST MEDICAL HISTORY:  Past Medical History:   Diagnosis Date     Arthritis      Heart disease      Hypertension      Stenosis of left subclavian artery  2019    Per duplex scan   - CAD  - chronic systolic heart failure with heart failure out of proportion to her CAD    FAMILY HISTORY:    Other Mother   osteoporosis     Dementia Mother     Heart Father     Heart Brother   pacemaker    Hypertension Brother     No Known Problems Daughter     No Known Problems Son     Heart Maternal Aunt    in her 50's; MI     Heart Maternal Uncle   diagnosed in his 20's     Diabetes Paternal Grandmother     Other Paternal Grandfather   emphysema         SOCIAL HISTORY:  Social History     Socioeconomic History     Marital status:      Spouse name: Not on file     Number of children: Not on file     Years of education: Not on file     Highest education level: Not on file   Occupational History     Not on file   Social Needs     Financial resource strain: Not on file     Food insecurity:     Worry: Not on file     Inability: Not on file     Transportation needs:     Medical: Not on file     Non-medical: Not on file   Tobacco Use     Smoking status: Former Smoker     Packs/day: 5.00     Types: Cigarettes     Last attempt to quit: 2018     Years since quittin.8     Smokeless tobacco: Never Used   Substance and Sexual Activity     Alcohol use: Yes     Drug use: None     Sexual activity: Not on file   Lifestyle     Physical activity:     Days per week: Not on file     Minutes per session: Not on file     Stress: Not on file   Relationships     Social connections:     Talks on phone: Not on file     Gets together: Not on  file     Attends Spiritism service: Not on file     Active member of club or organization: Not on file     Attends meetings of clubs or organizations: Not on file     Relationship status: Not on file     Intimate partner violence:     Fear of current or ex partner: Not on file     Emotionally abused: Not on file     Physically abused: Not on file     Forced sexual activity: Not on file   Other Topics Concern     Parent/sibling w/ CABG, MI or angioplasty before 65F 55M? Not Asked   Social History Narrative     Not on file       CURRENT MEDICATIONS:    Current Outpatient Medications on File Prior to Visit:  acetaminophen (TYLENOL) 500 MG tablet Take 1 tablet by mouth every 6 hours as needed for pain   aspirin 81 MG EC tablet Take 1 tablet by mouth daily   diphenhydrAMINE-acetaminophen (TYLENOL PM)  MG tablet Take 1-2 tablets by mouth nightly as needed for Insomnia   metoprolol succinate ER (TOPROL-XL) 50 MG 24 hr tablet Take 1 tablet by mouth 2 times daily   nitroGLYcerin (NITROSTAT) 0.4 MG sublingual tablet Place 0.4 mg under the tongue every 5 minutes as needed for chest pain Up to total of 3   rosuvastatin (CRESTOR) 40 MG tablet Take 1 tablet by mouth daily   sacubitril-valsartan (ENTRESTO)  MG per tablet Take 1 tablet by mouth 2 times daily   tiotropium (SPIRIVA HANDIHALER) 18 MCG inhaled capsule Inhale 18 mcg into the lungs daily   traMADol (ULTRAM) 50 MG tablet Take 1 tablet by mouth every 6 hours as needed for moderate pain   triamcinolone (KENALOG) 0.025 % cream Apply to affected area 2 times a day as needed for other (Specify) (eczema). Please dispense larger quantity than 15 g - thanks   Urea (URE-NA) 15 g PACK packet Take 1 packet (15 g) by mouth 1 time per day   vitamin D3 (CHOLECALCIFEROL) 1000 units (25 mcg) tablet Take 1 tablet by mouth daily     No current facility-administered medications on file prior to visit.       ROS:   Constitutional: No fever, chills, or sweats.  ENT: No visual  "disturbance, ear ache, epistaxis, sore throat.   Allergies/Immunologic: Negative.   Respiratory: No cough, hemoptysis.   Cardiovascular: As per HPI.   GI: No nausea, vomiting, hematemesis, melena, or hematochezia.   : No urinary frequency, dysuria, or hematuria.   Integument: Negative.   Psychiatric: Negative.   Neuro: Negative.   Endocrinology: Negative.   Musculoskeletal: Hio and knee pain    EXAM:  /79 (BP Location: Right arm, Patient Position: Chair, Cuff Size: Adult Regular)   Pulse 86   Ht 1.676 m (5' 6\")   Wt 44.6 kg (98 lb 6.4 oz)   SpO2 97%   Breastfeeding? No   BMI 15.88 kg/m     General: appears comfortable, alert and articulate  Head: normocephalic, atraumatic  Eyes: anicteric sclera, EOMI  Neck: no adenopathy  Orophyarynx: moist mucosa, no lesions, dentition intact  Heart: regular, S1/S2, no murmur, gallop, rub, estimated JVP 8cm with HJR  Lungs: clear, no rales or wheezing  Abdomen: soft, non-tender, bowel sounds present, no hepatomegaly  Extremities: no clubbing, cyanosis or edema  Neurological: normal speech and affect, no gross motor deficits    Labs:  CBC RESULTS:  Lab Results   Component Value Date    WBC 11.0 02/20/2019    RBC 4.42 02/20/2019    HGB 16.8 (H) 02/20/2019    HCT 47.6 (H) 02/20/2019     (H) 02/20/2019    MCH 38.0 (H) 02/20/2019    MCHC 35.3 02/20/2019    RDW 12.6 02/20/2019     02/20/2019       CMP RESULTS:  Lab Results   Component Value Date     02/21/2019    POTASSIUM 3.1 (L) 02/21/2019    CHLORIDE 94 02/21/2019    CO2 28 02/21/2019    ANIONGAP 12 02/21/2019    GLC 72 02/21/2019    BUN 7 02/21/2019    CR 0.42 (L) 02/21/2019    GFRESTIMATED >90 02/21/2019    GFRESTBLACK >90 02/21/2019    JUVENCIO 9.1 02/21/2019    BILITOTAL 1.0 02/20/2019    ALBUMIN 3.8 02/20/2019    ALKPHOS 113 02/20/2019    ALT 57 (H) 02/20/2019    AST 37 02/20/2019        Echo today:  Interpretation Summary  End diastolic volume is 91 ml.  End systolic volume is 61 ml.  LVEF 25% based " on biplane 2D tracing.  Inferior wall akinesis is present.  Posterior wall akinesis is present.  Right ventricular function, chamber size, wall motion, and thickness are normal.  The inferior vena cava is normal.  No pericardial effusion is present.  Previous study not available for comparison.              Assessment and Plan: 62 yo female with past medical history of chronic systolic heart failure, CAD but degree of heart failure out of proportion to her CAD HTN, HLD, and tobacco abuse, osteoporosis is referred for evaluation for advanced therapies.     1. Chronic systolic heart failure secondary to mixed nonischemic and ischemic cardiomyopathy as degree of heart failure out of proportion to her CAD    Stage C  NYHA Class III  ACEi/ARB yes, continue entresto 97-103mg bid  BB yes continue metoprolol xl 50mg bid  Aldosterone antagonist deferred at present.  May recommend starting in future if Cr and K remain stable  SCD prophylaxis ICD  Fluid status { euvolemic based on exam.  Will confirm on RHC tomorrow  NSAID use: contraindicated    CPX today:  Stopped 2/2 hip and knee discomfort and not sob/chest pain/dizziness.  Did not reach anerobic threshold but MVO2 was > 50% despite submaximal effort.  VE/VCO2 slope also < 35 and SBP peak > 120.  Pt also without any evidence of end organ compromise.  All of these indicate that at present patient is too well to benefit from advanced therapies.  However, given her lack of improvement in LVEF despite good heart failure regimen and her age, likely that patient may need these options in the future.  Will obtain RHC tomorrow to establish current hemodynamics and assess for options to optimize medical regimen.  Will also provide patient with literature on LVAD and heart transplant.     Encouraged patient to begin regular aerobic exercise aiming for at least 150 minutes of moderate physical activity or 75 minutes of vigorous physical activity - or an equal combination of both  - each week. and follow low-salt, heart healthy diet.   Reminded patient of need to maintain good oral hygiene and continue regular dental care        2.  Osteoporosis:  Discussed with patient recommendation for evaluation and treatment for osteoporosis.  Optimization of this will be important to optimize future advanced heart failure candidacy.       Follow-up:  One year with repeat CPX.  Will be happy to see sooner if change in clinical status or new questions/concerns arise - especially if patient has recurrent CHF exacerbations or if develops any evidence of end-organ compromise as we would reassess advance heart failure therapy candidacy at that time.    Margarita Callaway MD    CC  Patient Care Team:  Yayo Orozco PCP - General (Family Practice)  MARITZA SOLOMON     12-Feb-2020 00:48

## 2020-03-11 ENCOUNTER — HEALTH MAINTENANCE LETTER (OUTPATIENT)
Age: 65
End: 2020-03-11

## 2021-01-03 ENCOUNTER — HEALTH MAINTENANCE LETTER (OUTPATIENT)
Age: 66
End: 2021-01-03

## 2021-04-25 ENCOUNTER — HEALTH MAINTENANCE LETTER (OUTPATIENT)
Age: 66
End: 2021-04-25

## 2021-06-20 ENCOUNTER — HEALTH MAINTENANCE LETTER (OUTPATIENT)
Age: 66
End: 2021-06-20

## 2021-10-10 ENCOUNTER — HEALTH MAINTENANCE LETTER (OUTPATIENT)
Age: 66
End: 2021-10-10

## 2021-12-04 ENCOUNTER — HEALTH MAINTENANCE LETTER (OUTPATIENT)
Age: 66
End: 2021-12-04

## 2022-09-18 ENCOUNTER — HEALTH MAINTENANCE LETTER (OUTPATIENT)
Age: 67
End: 2022-09-18

## 2023-01-28 ENCOUNTER — HEALTH MAINTENANCE LETTER (OUTPATIENT)
Age: 68
End: 2023-01-28

## 2023-07-30 ENCOUNTER — HEALTH MAINTENANCE LETTER (OUTPATIENT)
Age: 68
End: 2023-07-30

## 2024-02-25 ENCOUNTER — HEALTH MAINTENANCE LETTER (OUTPATIENT)
Age: 69
End: 2024-02-25

## (undated) DEVICE — PACK HEART RIGHT CUSTOM SAN32RHF18

## (undated) DEVICE — Device

## (undated) DEVICE — INTRO SHEATH 7FRX10CM PINNACLE RSS702

## (undated) DEVICE — KIT RIGHT HEART CATH H965601307191

## (undated) RX ORDER — LIDOCAINE 40 MG/G
CREAM TOPICAL
Status: DISPENSED
Start: 2019-02-21